# Patient Record
Sex: FEMALE | Race: ASIAN | NOT HISPANIC OR LATINO
[De-identification: names, ages, dates, MRNs, and addresses within clinical notes are randomized per-mention and may not be internally consistent; named-entity substitution may affect disease eponyms.]

---

## 2020-09-02 ENCOUNTER — NON-APPOINTMENT (OUTPATIENT)
Age: 45
End: 2020-09-02

## 2020-09-03 ENCOUNTER — APPOINTMENT (OUTPATIENT)
Dept: INTERNAL MEDICINE | Facility: CLINIC | Age: 45
End: 2020-09-03
Payer: MEDICAID

## 2020-09-03 VITALS
HEART RATE: 78 BPM | WEIGHT: 136 LBS | BODY MASS INDEX: 24.4 KG/M2 | RESPIRATION RATE: 14 BRPM | SYSTOLIC BLOOD PRESSURE: 120 MMHG | HEIGHT: 62.5 IN | TEMPERATURE: 97.7 F | OXYGEN SATURATION: 98 % | DIASTOLIC BLOOD PRESSURE: 70 MMHG

## 2020-09-03 DIAGNOSIS — Z78.9 OTHER SPECIFIED HEALTH STATUS: ICD-10-CM

## 2020-09-03 PROCEDURE — 99386 PREV VISIT NEW AGE 40-64: CPT

## 2020-09-03 NOTE — HISTORY OF PRESENT ILLNESS
[FreeTextEntry1] : HCM [de-identified] : 43 yo F w/ PMH of gallstones presents to the office for annual physical and work clearance. Patient recently immigrated from [place] this past May. Patient has never had a pap smear or mammogram. Patient denies any symptoms. Denies any HA, CP, SOB, n/v, abdominal pain, or dysuria.

## 2020-09-03 NOTE — ASSESSMENT
[FreeTextEntry1] : 45yo F w/ PMH of gallstones presents to the office for annual physical and work clearance

## 2020-09-03 NOTE — HEALTH RISK ASSESSMENT
[Very Good] : ~his/her~  mood as very good [No] : No [0] : 2) Feeling down, depressed, or hopeless: Not at all (0) [HIV test declined] : HIV test declined [Hepatitis C test declined] : Hepatitis C test declined [With Family] : lives with family [# Of Children ___] : has [unfilled] children [] : No [Change in mental status noted] : No change in mental status noted [Reports changes in hearing] : Reports no changes in hearing [Reports changes in vision] : Reports no changes in vision

## 2020-09-03 NOTE — PHYSICAL EXAM
[No Acute Distress] : no acute distress [Well Nourished] : well nourished [Normal Sclera/Conjunctiva] : normal sclera/conjunctiva [Well-Appearing] : well-appearing [Well Developed] : well developed [PERRL] : pupils equal round and reactive to light [EOMI] : extraocular movements intact [Normal Oropharynx] : the oropharynx was normal [Normal Outer Ear/Nose] : the outer ears and nose were normal in appearance [No Lymphadenopathy] : no lymphadenopathy [No JVD] : no jugular venous distention [Thyroid Normal, No Nodules] : the thyroid was normal and there were no nodules present [Supple] : supple [Clear to Auscultation] : lungs were clear to auscultation bilaterally [No Accessory Muscle Use] : no accessory muscle use [No Respiratory Distress] : no respiratory distress  [Regular Rhythm] : with a regular rhythm [Normal Rate] : normal rate  [Normal S1, S2] : normal S1 and S2 [No Murmur] : no murmur heard [No Abdominal Bruit] : a ~M bruit was not heard ~T in the abdomen [No Carotid Bruits] : no carotid bruits [Pedal Pulses Present] : the pedal pulses are present [No Edema] : there was no peripheral edema [No Varicosities] : no varicosities [No Extremity Clubbing/Cyanosis] : no extremity clubbing/cyanosis [No Palpable Aorta] : no palpable aorta [Soft] : abdomen soft [Non Tender] : non-tender [Non-distended] : non-distended [No Masses] : no abdominal mass palpated [No HSM] : no HSM [Normal Bowel Sounds] : normal bowel sounds [Normal Posterior Cervical Nodes] : no posterior cervical lymphadenopathy [Normal Anterior Cervical Nodes] : no anterior cervical lymphadenopathy [No Spinal Tenderness] : no spinal tenderness [No CVA Tenderness] : no CVA  tenderness [Grossly Normal Strength/Tone] : grossly normal strength/tone [No Joint Swelling] : no joint swelling [No Focal Deficits] : no focal deficits [Coordination Grossly Intact] : coordination grossly intact [No Rash] : no rash [Normal Gait] : normal gait [Normal Affect] : the affect was normal [Normal Insight/Judgement] : insight and judgment were intact

## 2020-09-03 NOTE — PLAN
[FreeTextEntry1] : HCM\par -f/u labs\par -referred for pap smear\par -mammogram script provided\par -pending Hep B and MMR titers, drug screen, and quantiferon results for employment physical\par

## 2020-09-04 LAB
25(OH)D3 SERPL-MCNC: 28.8 NG/ML
ALBUMIN SERPL ELPH-MCNC: 4.8 G/DL
ALP BLD-CCNC: 56 U/L
ALT SERPL-CCNC: 17 U/L
ANION GAP SERPL CALC-SCNC: 12 MMOL/L
AST SERPL-CCNC: 17 U/L
BASOPHILS # BLD AUTO: 0.02 K/UL
BASOPHILS NFR BLD AUTO: 0.2 %
BILIRUB SERPL-MCNC: 0.4 MG/DL
BUN SERPL-MCNC: 16 MG/DL
CALCIUM SERPL-MCNC: 9.8 MG/DL
CHLORIDE SERPL-SCNC: 103 MMOL/L
CHOLEST SERPL-MCNC: 210 MG/DL
CHOLEST/HDLC SERPL: 4.2 RATIO
CO2 SERPL-SCNC: 23 MMOL/L
CREAT SERPL-MCNC: 0.75 MG/DL
EOSINOPHIL # BLD AUTO: 0.07 K/UL
EOSINOPHIL NFR BLD AUTO: 0.9 %
ESTIMATED AVERAGE GLUCOSE: 123 MG/DL
FOLATE SERPL-MCNC: 13.3 NG/ML
GLUCOSE SERPL-MCNC: 87 MG/DL
HBA1C MFR BLD HPLC: 5.9 %
HCT VFR BLD CALC: 40.4 %
HDLC SERPL-MCNC: 51 MG/DL
HGB BLD-MCNC: 13.4 G/DL
IMM GRANULOCYTES NFR BLD AUTO: 0.5 %
LDLC SERPL CALC-MCNC: 128 MG/DL
LYMPHOCYTES # BLD AUTO: 1.84 K/UL
LYMPHOCYTES NFR BLD AUTO: 22.8 %
MAN DIFF?: NORMAL
MCHC RBC-ENTMCNC: 33.1 PG
MCHC RBC-ENTMCNC: 33.2 GM/DL
MCV RBC AUTO: 99.8 FL
MONOCYTES # BLD AUTO: 0.61 K/UL
MONOCYTES NFR BLD AUTO: 7.5 %
NEUTROPHILS # BLD AUTO: 5.5 K/UL
NEUTROPHILS NFR BLD AUTO: 68.1 %
PLATELET # BLD AUTO: 312 K/UL
POTASSIUM SERPL-SCNC: 4.5 MMOL/L
PROT SERPL-MCNC: 7.4 G/DL
RBC # BLD: 4.05 M/UL
RBC # FLD: 12.3 %
SODIUM SERPL-SCNC: 138 MMOL/L
TRIGL SERPL-MCNC: 160 MG/DL
TSH SERPL-ACNC: 4.2 UIU/ML
VIT B12 SERPL-MCNC: 601 PG/ML
WBC # FLD AUTO: 8.08 K/UL

## 2020-09-08 ENCOUNTER — TRANSCRIPTION ENCOUNTER (OUTPATIENT)
Age: 45
End: 2020-09-08

## 2020-09-08 LAB
AMPHET UR-MCNC: NEGATIVE
BARBITURATES UR-MCNC: NEGATIVE
BENZODIAZ UR-MCNC: NEGATIVE
COCAINE METAB.OTHER UR-MCNC: NEGATIVE
CREATININE, URINE: 163.7 MG/DL
HBV SURFACE AB SERPL IA-ACNC: <3 MIU/ML
M TB IFN-G BLD-IMP: NEGATIVE
METHADONE UR-MCNC: NEGATIVE
METHAQUALONE UR-MCNC: NEGATIVE
MEV IGG FLD QL IA: >300 AU/ML
MEV IGG+IGM SER-IMP: POSITIVE
MUV AB SER-ACNC: POSITIVE
MUV IGG SER QL IA: 193 AU/ML
OPIATES UR-MCNC: NEGATIVE
PCP UR-MCNC: NEGATIVE
PROPOXYPH UR QL: NEGATIVE
QUANTIFERON TB PLUS MITOGEN MINUS NIL: 6.92 IU/ML
QUANTIFERON TB PLUS NIL: 0.04 IU/ML
QUANTIFERON TB PLUS TB1 MINUS NIL: 0.08 IU/ML
QUANTIFERON TB PLUS TB2 MINUS NIL: 0.1 IU/ML
RUBV IGG FLD-ACNC: 5.8 INDEX
RUBV IGG SER-IMP: POSITIVE
THC UR QL: NEGATIVE

## 2020-09-11 ENCOUNTER — APPOINTMENT (OUTPATIENT)
Dept: INTERNAL MEDICINE | Facility: CLINIC | Age: 45
End: 2020-09-11
Payer: MEDICAID

## 2020-09-11 DIAGNOSIS — Z23 ENCOUNTER FOR IMMUNIZATION: ICD-10-CM

## 2020-09-11 PROCEDURE — 90472 IMMUNIZATION ADMIN EACH ADD: CPT

## 2020-09-11 PROCEDURE — 90686 IIV4 VACC NO PRSV 0.5 ML IM: CPT

## 2020-09-11 PROCEDURE — G0008: CPT

## 2020-09-11 PROCEDURE — 90746 HEPB VACCINE 3 DOSE ADULT IM: CPT

## 2021-09-06 ENCOUNTER — NON-APPOINTMENT (OUTPATIENT)
Age: 46
End: 2021-09-06

## 2021-09-09 ENCOUNTER — APPOINTMENT (OUTPATIENT)
Dept: INTERNAL MEDICINE | Facility: CLINIC | Age: 46
End: 2021-09-09
Payer: COMMERCIAL

## 2021-09-09 ENCOUNTER — LABORATORY RESULT (OUTPATIENT)
Age: 46
End: 2021-09-09

## 2021-09-09 VITALS
TEMPERATURE: 97.2 F | OXYGEN SATURATION: 98 % | SYSTOLIC BLOOD PRESSURE: 120 MMHG | DIASTOLIC BLOOD PRESSURE: 70 MMHG | BODY MASS INDEX: 22.79 KG/M2 | RESPIRATION RATE: 14 BRPM | HEART RATE: 89 BPM | WEIGHT: 127 LBS | HEIGHT: 62.5 IN

## 2021-09-09 PROCEDURE — G0008: CPT

## 2021-09-09 PROCEDURE — 99396 PREV VISIT EST AGE 40-64: CPT | Mod: 25

## 2021-09-09 PROCEDURE — 90686 IIV4 VACC NO PRSV 0.5 ML IM: CPT

## 2021-09-09 NOTE — HISTORY OF PRESENT ILLNESS
[de-identified] : Pt here for CPE. Feeling well, no acute complaints. Pt needs forms for job filled out.

## 2021-09-09 NOTE — PHYSICAL EXAM
[No Acute Distress] : no acute distress [Well Nourished] : well nourished [Well Developed] : well developed [Well-Appearing] : well-appearing [Normal Sclera/Conjunctiva] : normal sclera/conjunctiva [PERRL] : pupils equal round and reactive to light [EOMI] : extraocular movements intact [Normal Outer Ear/Nose] : the outer ears and nose were normal in appearance [Normal Oropharynx] : the oropharynx was normal [No JVD] : no jugular venous distention [No Lymphadenopathy] : no lymphadenopathy [Supple] : supple [Thyroid Normal, No Nodules] : the thyroid was normal and there were no nodules present [No Respiratory Distress] : no respiratory distress  [No Accessory Muscle Use] : no accessory muscle use [Clear to Auscultation] : lungs were clear to auscultation bilaterally [Normal Rate] : normal rate  [Regular Rhythm] : with a regular rhythm [Normal S1, S2] : normal S1 and S2 [No Murmur] : no murmur heard [No Abdominal Bruit] : a ~M bruit was not heard ~T in the abdomen [No Carotid Bruits] : no carotid bruits [No Varicosities] : no varicosities [Pedal Pulses Present] : the pedal pulses are present [No Edema] : there was no peripheral edema [No Palpable Aorta] : no palpable aorta [No Extremity Clubbing/Cyanosis] : no extremity clubbing/cyanosis [Soft] : abdomen soft [Non Tender] : non-tender [No Masses] : no abdominal mass palpated [Non-distended] : non-distended [No HSM] : no HSM [Normal Bowel Sounds] : normal bowel sounds [Normal Posterior Cervical Nodes] : no posterior cervical lymphadenopathy [Normal Anterior Cervical Nodes] : no anterior cervical lymphadenopathy [No CVA Tenderness] : no CVA  tenderness [No Spinal Tenderness] : no spinal tenderness [No Joint Swelling] : no joint swelling [Grossly Normal Strength/Tone] : grossly normal strength/tone [No Rash] : no rash [Coordination Grossly Intact] : coordination grossly intact [No Focal Deficits] : no focal deficits [Normal Gait] : normal gait [Deep Tendon Reflexes (DTR)] : deep tendon reflexes were 2+ and symmetric [Normal Affect] : the affect was normal [Normal Insight/Judgement] : insight and judgment were intact

## 2021-09-09 NOTE — PLAN
Problem: Communication  Goal: The ability to communicate needs accurately and effectively will improve  Outcome: Progressing     Problem: Safety  Goal: Will remain free from injury  Outcome: Progressing  Goal: Will remain free from falls  Outcome: Progressing     Problem: Skin Integrity  Goal: Risk for impaired skin integrity will decrease  Outcome: Progressing       Summary of progress made towards problems/goals:  Patient is able to verbalize needs, calls appropriately. Patient provided safety education, verbalizes understanding. Bed is in lowest/locked position. Bed alarm on. Q2 turns in place. Frequent checks for incontinence.        [FreeTextEntry1] : HCM: \par -check labs\par -mammo script provided\par -advise f/u with GYN for pap smear \par -pt is covid vaccinated\par -flu shot today \par

## 2021-09-16 ENCOUNTER — APPOINTMENT (OUTPATIENT)
Dept: MAMMOGRAPHY | Facility: CLINIC | Age: 46
End: 2021-09-16
Payer: COMMERCIAL

## 2021-09-16 ENCOUNTER — RESULT REVIEW (OUTPATIENT)
Age: 46
End: 2021-09-16

## 2021-09-16 ENCOUNTER — OUTPATIENT (OUTPATIENT)
Dept: OUTPATIENT SERVICES | Facility: HOSPITAL | Age: 46
LOS: 1 days | End: 2021-09-16
Payer: COMMERCIAL

## 2021-09-16 DIAGNOSIS — Z00.8 ENCOUNTER FOR OTHER GENERAL EXAMINATION: ICD-10-CM

## 2021-09-16 DIAGNOSIS — Z00.00 ENCOUNTER FOR GENERAL ADULT MEDICAL EXAMINATION WITHOUT ABNORMAL FINDINGS: ICD-10-CM

## 2021-09-16 LAB
25(OH)D3 SERPL-MCNC: 40.3 NG/ML
ALBUMIN SERPL ELPH-MCNC: 4.6 G/DL
ALP BLD-CCNC: 61 U/L
ALT SERPL-CCNC: 17 U/L
AMPHET UR-MCNC: NEGATIVE
ANION GAP SERPL CALC-SCNC: 11 MMOL/L
AST SERPL-CCNC: 18 U/L
BARBITURATES UR-MCNC: NEGATIVE
BASOPHILS # BLD AUTO: 0.04 K/UL
BASOPHILS NFR BLD AUTO: 0.6 %
BENZODIAZ UR-MCNC: NEGATIVE
BILIRUB SERPL-MCNC: 0.4 MG/DL
BUN SERPL-MCNC: 11 MG/DL
CALCIUM SERPL-MCNC: 9.4 MG/DL
CHLORIDE SERPL-SCNC: 104 MMOL/L
CHOLEST SERPL-MCNC: 224 MG/DL
CO2 SERPL-SCNC: 27 MMOL/L
COCAINE METAB.OTHER UR-MCNC: NEGATIVE
CREAT SERPL-MCNC: 0.79 MG/DL
CREATININE, URINE: 182.4 MG/DL
EOSINOPHIL # BLD AUTO: 0.17 K/UL
EOSINOPHIL NFR BLD AUTO: 2.6 %
ESTIMATED AVERAGE GLUCOSE: 120 MG/DL
FOLATE SERPL-MCNC: >20 NG/ML
GLUCOSE SERPL-MCNC: 97 MG/DL
HBA1C MFR BLD HPLC: 5.8 %
HBV SURFACE AB SER QL: NONREACTIVE
HCT VFR BLD CALC: 38.8 %
HDLC SERPL-MCNC: 55 MG/DL
HGB BLD-MCNC: 13.5 G/DL
IMM GRANULOCYTES NFR BLD AUTO: 0.3 %
LDLC SERPL CALC-MCNC: 140 MG/DL
LYMPHOCYTES # BLD AUTO: 1.83 K/UL
LYMPHOCYTES NFR BLD AUTO: 27.7 %
M TB IFN-G BLD-IMP: NEGATIVE
MAN DIFF?: NORMAL
MCHC RBC-ENTMCNC: 33.3 PG
MCHC RBC-ENTMCNC: 34.8 GM/DL
MCV RBC AUTO: 95.6 FL
METHADONE UR-MCNC: NEGATIVE
METHAQUALONE UR-MCNC: NEGATIVE
MONOCYTES # BLD AUTO: 0.43 K/UL
MONOCYTES NFR BLD AUTO: 6.5 %
NEUTROPHILS # BLD AUTO: 4.12 K/UL
NEUTROPHILS NFR BLD AUTO: 62.3 %
NONHDLC SERPL-MCNC: 170 MG/DL
OPIATES UR-MCNC: NEGATIVE
PCP UR-MCNC: NEGATIVE
PLATELET # BLD AUTO: 306 K/UL
POTASSIUM SERPL-SCNC: 4.4 MMOL/L
PROPOXYPH UR QL: NEGATIVE
PROT SERPL-MCNC: 7.4 G/DL
QUANTIFERON TB PLUS MITOGEN MINUS NIL: 9.79 IU/ML
QUANTIFERON TB PLUS NIL: 0.03 IU/ML
QUANTIFERON TB PLUS TB1 MINUS NIL: 0.11 IU/ML
QUANTIFERON TB PLUS TB2 MINUS NIL: 0.29 IU/ML
RBC # BLD: 4.06 M/UL
RBC # FLD: 12.2 %
SODIUM SERPL-SCNC: 141 MMOL/L
THC UR QL: NEGATIVE
TRIGL SERPL-MCNC: 147 MG/DL
TSH SERPL-ACNC: 7.51 UIU/ML
VIT B12 SERPL-MCNC: 1953 PG/ML
WBC # FLD AUTO: 6.61 K/UL

## 2021-09-16 PROCEDURE — 77063 BREAST TOMOSYNTHESIS BI: CPT | Mod: 26

## 2021-09-16 PROCEDURE — 77067 SCR MAMMO BI INCL CAD: CPT

## 2021-09-16 PROCEDURE — 77067 SCR MAMMO BI INCL CAD: CPT | Mod: 26

## 2021-09-16 PROCEDURE — 77063 BREAST TOMOSYNTHESIS BI: CPT

## 2021-09-17 ENCOUNTER — APPOINTMENT (OUTPATIENT)
Dept: INTERNAL MEDICINE | Facility: CLINIC | Age: 46
End: 2021-09-17
Payer: COMMERCIAL

## 2021-09-17 PROCEDURE — G0010: CPT

## 2021-09-17 PROCEDURE — 90746 HEPB VACCINE 3 DOSE ADULT IM: CPT

## 2021-12-16 ENCOUNTER — NON-APPOINTMENT (OUTPATIENT)
Age: 46
End: 2021-12-16

## 2021-12-17 ENCOUNTER — LABORATORY RESULT (OUTPATIENT)
Age: 46
End: 2021-12-17

## 2021-12-17 ENCOUNTER — APPOINTMENT (OUTPATIENT)
Dept: OBGYN | Facility: CLINIC | Age: 46
End: 2021-12-17
Payer: COMMERCIAL

## 2021-12-17 VITALS
WEIGHT: 136 LBS | BODY MASS INDEX: 25.03 KG/M2 | SYSTOLIC BLOOD PRESSURE: 106 MMHG | DIASTOLIC BLOOD PRESSURE: 60 MMHG | HEIGHT: 62 IN

## 2021-12-17 DIAGNOSIS — Z01.419 ENCOUNTER FOR GYNECOLOGICAL EXAMINATION (GENERAL) (ROUTINE) W/OUT ABNORMAL FINDINGS: ICD-10-CM

## 2021-12-17 PROCEDURE — 99386 PREV VISIT NEW AGE 40-64: CPT

## 2021-12-20 LAB — HPV HIGH+LOW RISK DNA PNL CVX: DETECTED

## 2021-12-28 ENCOUNTER — NON-APPOINTMENT (OUTPATIENT)
Age: 46
End: 2021-12-28

## 2022-03-07 ENCOUNTER — APPOINTMENT (OUTPATIENT)
Dept: OBGYN | Facility: CLINIC | Age: 47
End: 2022-03-07
Payer: COMMERCIAL

## 2022-03-07 ENCOUNTER — LABORATORY RESULT (OUTPATIENT)
Age: 47
End: 2022-03-07

## 2022-03-07 PROCEDURE — 81025 URINE PREGNANCY TEST: CPT

## 2022-03-07 PROCEDURE — 57454 BX/CURETT OF CERVIX W/SCOPE: CPT

## 2022-03-07 NOTE — PROCEDURE
[Colposcopy] : Colposcopy  [Time out performed] : Pre-procedure time out performed.  Patient's name, date of birth and procedure confirmed. [Consent Obtained] : Consent obtained [Risks] : risks [Benefits] : benefits [Alternatives] : alternatives [Patient] : patient [Infection] : infection [Bleeding] : bleeding [Allergic Reaction] : allergic reaction [LGSIL] : LGSIL [Colposcopy Adequate] : colposcopy adequate [SCI Fully Visualized] : SCI fully visualized [ECC Performed] : ECC performed [No Abnormalities] : no abnormalities [Biopsy] : biopsy taken [Hemostasis Obtained] : Hemostasis obtained [Tolerated Well] : the patient tolerated the procedure well [de-identified] : 2 [de-identified] : 6 and 7 o'clock [de-identified] : acetowhite at both sites

## 2022-03-10 ENCOUNTER — NON-APPOINTMENT (OUTPATIENT)
Age: 47
End: 2022-03-10

## 2022-04-07 ENCOUNTER — LABORATORY RESULT (OUTPATIENT)
Age: 47
End: 2022-04-07

## 2022-04-07 ENCOUNTER — APPOINTMENT (OUTPATIENT)
Dept: OBGYN | Facility: CLINIC | Age: 47
End: 2022-04-07
Payer: COMMERCIAL

## 2022-04-07 VITALS
DIASTOLIC BLOOD PRESSURE: 52 MMHG | SYSTOLIC BLOOD PRESSURE: 100 MMHG | BODY MASS INDEX: 25.03 KG/M2 | WEIGHT: 136 LBS | HEIGHT: 62 IN

## 2022-04-07 PROCEDURE — 81025 URINE PREGNANCY TEST: CPT

## 2022-04-07 PROCEDURE — 57460 BX OF CERVIX W/SCOPE LEEP: CPT

## 2022-04-07 NOTE — PROCEDURE
[Colposcopy with Loop Electrode Excision of the Cervix] : Colposcopy with loop electrode excision of the cervix [Time out performed] : Pre-procedure time out performed.  Patient's name, date of birth and procedure confirmed. [Consent Obtained] : Consent obtained [Severe Dysplasia] : severe dysplasia [Positive ECC] : positive ECC [Risks] : risks [Benefits] : benefits [Alternatives] : alternatives [Patient] : patient [Infection] : infection [Bleeding] : bleeding [Allergic Reaction] : allergic reaction [Injury to Vagina] : injury to vagina [Injury to Cervix] : injury to cervix [Negative] : negative pregnancy test [____mg Lidocaine w/Epi] : ~Vmg  lidocaine with epinephrine [SCJ Fully Visualized] : SCJ fully visualized [Cutting Setting ___watts] : cutting setting [unfilled] chaudhary [Coag Setting ___watts] : coag setting [unfilled] chaudhary [ECC Done] : ECC done [Hemostasis Obtained] : Hemostasis obtained [Sent to Pathology] : the specimen was placed in buffered formalin and sent for pathology [Tolerated Well] : the patient tolerated the procedure well [de-identified] : Liat's solution

## 2022-04-08 LAB
HCG UR QL: NEGATIVE
QUALITY CONTROL: YES

## 2022-04-12 ENCOUNTER — NON-APPOINTMENT (OUTPATIENT)
Age: 47
End: 2022-04-12

## 2022-09-12 ENCOUNTER — APPOINTMENT (OUTPATIENT)
Dept: INTERNAL MEDICINE | Facility: CLINIC | Age: 47
End: 2022-09-12

## 2022-09-12 VITALS
DIASTOLIC BLOOD PRESSURE: 70 MMHG | OXYGEN SATURATION: 99 % | TEMPERATURE: 97.5 F | BODY MASS INDEX: 23.55 KG/M2 | SYSTOLIC BLOOD PRESSURE: 100 MMHG | RESPIRATION RATE: 14 BRPM | WEIGHT: 128 LBS | HEIGHT: 62 IN | HEART RATE: 76 BPM

## 2022-09-12 PROCEDURE — 99396 PREV VISIT EST AGE 40-64: CPT | Mod: 25

## 2022-09-12 PROCEDURE — 90686 IIV4 VACC NO PRSV 0.5 ML IM: CPT

## 2022-09-12 PROCEDURE — G0008: CPT

## 2022-09-12 NOTE — PLAN
[FreeTextEntry1] : HCM:\par -check labs \par -UTD with pap smear \par -due for mammo, script provided\par -iFOBT provided\par -flu shot today \par \par

## 2022-09-12 NOTE — HISTORY OF PRESENT ILLNESS
[de-identified] : Pt here for CPE. Needs forms filled out for work. Feeling well, no acute complaints.

## 2022-09-13 LAB
25(OH)D3 SERPL-MCNC: 35 NG/ML
ALBUMIN SERPL ELPH-MCNC: 4.7 G/DL
ALP BLD-CCNC: 73 U/L
ALT SERPL-CCNC: 23 U/L
ANION GAP SERPL CALC-SCNC: 9 MMOL/L
AST SERPL-CCNC: 18 U/L
BASOPHILS # BLD AUTO: 0.03 K/UL
BASOPHILS NFR BLD AUTO: 0.6 %
BILIRUB SERPL-MCNC: 0.5 MG/DL
BUN SERPL-MCNC: 13 MG/DL
CALCIUM SERPL-MCNC: 9.9 MG/DL
CHLORIDE SERPL-SCNC: 105 MMOL/L
CHOLEST SERPL-MCNC: 231 MG/DL
CO2 SERPL-SCNC: 27 MMOL/L
CREAT SERPL-MCNC: 0.71 MG/DL
EGFR: 105 ML/MIN/1.73M2
EOSINOPHIL # BLD AUTO: 0.06 K/UL
EOSINOPHIL NFR BLD AUTO: 1.1 %
ESTIMATED AVERAGE GLUCOSE: 120 MG/DL
FOLATE SERPL-MCNC: 18.5 NG/ML
GLUCOSE SERPL-MCNC: 97 MG/DL
HBA1C MFR BLD HPLC: 5.8 %
HBV SURFACE AB SER QL: NONREACTIVE
HCT VFR BLD CALC: 41.2 %
HDLC SERPL-MCNC: 58 MG/DL
HGB BLD-MCNC: 13.8 G/DL
IMM GRANULOCYTES NFR BLD AUTO: 0.2 %
LDLC SERPL CALC-MCNC: 140 MG/DL
LYMPHOCYTES # BLD AUTO: 1.6 K/UL
LYMPHOCYTES NFR BLD AUTO: 30.2 %
MAN DIFF?: NORMAL
MCHC RBC-ENTMCNC: 32.5 PG
MCHC RBC-ENTMCNC: 33.5 GM/DL
MCV RBC AUTO: 96.9 FL
MONOCYTES # BLD AUTO: 0.32 K/UL
MONOCYTES NFR BLD AUTO: 6 %
NEUTROPHILS # BLD AUTO: 3.28 K/UL
NEUTROPHILS NFR BLD AUTO: 61.9 %
NONHDLC SERPL-MCNC: 173 MG/DL
PLATELET # BLD AUTO: 344 K/UL
POTASSIUM SERPL-SCNC: 4.9 MMOL/L
PROT SERPL-MCNC: 7.4 G/DL
RBC # BLD: 4.25 M/UL
RBC # FLD: 12 %
SODIUM SERPL-SCNC: 142 MMOL/L
TRIGL SERPL-MCNC: 165 MG/DL
TSH SERPL-ACNC: 2.42 UIU/ML
VIT B12 SERPL-MCNC: 841 PG/ML
WBC # FLD AUTO: 5.3 K/UL

## 2022-09-15 LAB
M TB IFN-G BLD-IMP: NEGATIVE
QUANTIFERON TB PLUS MITOGEN MINUS NIL: >10 IU/ML
QUANTIFERON TB PLUS NIL: 0.02 IU/ML
QUANTIFERON TB PLUS TB1 MINUS NIL: 0.09 IU/ML
QUANTIFERON TB PLUS TB2 MINUS NIL: 0.07 IU/ML

## 2022-09-17 LAB
AMPHET UR-MCNC: NEGATIVE
BARBITURATES UR-MCNC: NEGATIVE
BENZODIAZ UR-MCNC: NEGATIVE
COCAINE METAB.OTHER UR-MCNC: NEGATIVE
CREATININE, URINE: 134.3 MG/DL
METHADONE UR-MCNC: NEGATIVE
METHAQUALONE UR-MCNC: NEGATIVE
OPIATES UR-MCNC: NEGATIVE
PCP UR-MCNC: NEGATIVE
PROPOXYPH UR QL: NEGATIVE
THC UR QL: NEGATIVE

## 2022-09-21 ENCOUNTER — APPOINTMENT (OUTPATIENT)
Dept: INTERNAL MEDICINE | Facility: CLINIC | Age: 47
End: 2022-09-21

## 2022-09-21 PROCEDURE — 90746 HEPB VACCINE 3 DOSE ADULT IM: CPT

## 2022-09-21 PROCEDURE — G0010: CPT

## 2023-09-19 ENCOUNTER — APPOINTMENT (OUTPATIENT)
Dept: INTERNAL MEDICINE | Facility: CLINIC | Age: 48
End: 2023-09-19
Payer: MEDICAID

## 2023-09-19 VITALS
BODY MASS INDEX: 24.11 KG/M2 | TEMPERATURE: 97.5 F | OXYGEN SATURATION: 98 % | HEART RATE: 75 BPM | DIASTOLIC BLOOD PRESSURE: 62 MMHG | HEIGHT: 62 IN | SYSTOLIC BLOOD PRESSURE: 100 MMHG | WEIGHT: 131 LBS | RESPIRATION RATE: 14 BRPM

## 2023-09-19 DIAGNOSIS — Z23 ENCOUNTER FOR IMMUNIZATION: ICD-10-CM

## 2023-09-19 DIAGNOSIS — Z00.00 ENCOUNTER FOR GENERAL ADULT MEDICAL EXAMINATION W/OUT ABNORMAL FINDINGS: ICD-10-CM

## 2023-09-19 PROCEDURE — 99396 PREV VISIT EST AGE 40-64: CPT | Mod: 25

## 2023-09-19 PROCEDURE — G0008: CPT

## 2023-09-19 PROCEDURE — 90686 IIV4 VACC NO PRSV 0.5 ML IM: CPT

## 2023-09-21 LAB
ALBUMIN SERPL ELPH-MCNC: 4.9 G/DL
ALP BLD-CCNC: 69 U/L
ALT SERPL-CCNC: 22 U/L
ANION GAP SERPL CALC-SCNC: 15 MMOL/L
AST SERPL-CCNC: 18 U/L
BASOPHILS # BLD AUTO: 0.05 K/UL
BASOPHILS NFR BLD AUTO: 0.9 %
BILIRUB SERPL-MCNC: 0.5 MG/DL
BUN SERPL-MCNC: 14 MG/DL
CALCIUM SERPL-MCNC: 9.9 MG/DL
CHLORIDE SERPL-SCNC: 105 MMOL/L
CHOLEST SERPL-MCNC: 248 MG/DL
CO2 SERPL-SCNC: 21 MMOL/L
CREAT SERPL-MCNC: 0.67 MG/DL
EGFR: 108 ML/MIN/1.73M2
EOSINOPHIL # BLD AUTO: 0.1 K/UL
EOSINOPHIL NFR BLD AUTO: 1.7 %
ESTIMATED AVERAGE GLUCOSE: 128 MG/DL
GLUCOSE SERPL-MCNC: 108 MG/DL
HBA1C MFR BLD HPLC: 6.1 %
HCT VFR BLD CALC: 40.4 %
HDLC SERPL-MCNC: 52 MG/DL
HGB BLD-MCNC: 14 G/DL
IMM GRANULOCYTES NFR BLD AUTO: 0.2 %
LDLC SERPL CALC-MCNC: 150 MG/DL
LYMPHOCYTES # BLD AUTO: 1.83 K/UL
LYMPHOCYTES NFR BLD AUTO: 31.3 %
MAN DIFF?: NORMAL
MCHC RBC-ENTMCNC: 33.6 PG
MCHC RBC-ENTMCNC: 34.7 GM/DL
MCV RBC AUTO: 96.9 FL
MONOCYTES # BLD AUTO: 0.36 K/UL
MONOCYTES NFR BLD AUTO: 6.2 %
NEUTROPHILS # BLD AUTO: 3.49 K/UL
NEUTROPHILS NFR BLD AUTO: 59.7 %
NONHDLC SERPL-MCNC: 197 MG/DL
PLATELET # BLD AUTO: 327 K/UL
POTASSIUM SERPL-SCNC: 4.3 MMOL/L
PROT SERPL-MCNC: 7.7 G/DL
RBC # BLD: 4.17 M/UL
RBC # FLD: 12.1 %
SODIUM SERPL-SCNC: 141 MMOL/L
TRIGL SERPL-MCNC: 253 MG/DL
TSH SERPL-ACNC: 3.72 UIU/ML
WBC # FLD AUTO: 5.84 K/UL

## 2023-10-21 ENCOUNTER — OUTPATIENT (OUTPATIENT)
Dept: OUTPATIENT SERVICES | Facility: HOSPITAL | Age: 48
LOS: 1 days | End: 2023-10-21
Payer: MEDICAID

## 2023-10-21 ENCOUNTER — APPOINTMENT (OUTPATIENT)
Dept: MAMMOGRAPHY | Facility: CLINIC | Age: 48
End: 2023-10-21
Payer: MEDICAID

## 2023-10-21 ENCOUNTER — RESULT REVIEW (OUTPATIENT)
Age: 48
End: 2023-10-21

## 2023-10-21 DIAGNOSIS — Z00.00 ENCOUNTER FOR GENERAL ADULT MEDICAL EXAMINATION WITHOUT ABNORMAL FINDINGS: ICD-10-CM

## 2023-10-21 DIAGNOSIS — Z00.8 ENCOUNTER FOR OTHER GENERAL EXAMINATION: ICD-10-CM

## 2023-10-21 PROCEDURE — 77067 SCR MAMMO BI INCL CAD: CPT

## 2023-10-21 PROCEDURE — 77063 BREAST TOMOSYNTHESIS BI: CPT | Mod: 26

## 2023-10-21 PROCEDURE — 77063 BREAST TOMOSYNTHESIS BI: CPT

## 2023-10-21 PROCEDURE — 77067 SCR MAMMO BI INCL CAD: CPT | Mod: 26

## 2023-11-16 PROBLEM — K80.20 GALLSTONE: Status: ACTIVE | Noted: 2020-09-03

## 2023-11-16 PROBLEM — R87.612 LOW GRADE SQUAMOUS INTRAEPITHELIAL LESION (LGSIL) ON CERVICAL PAP SMEAR: Status: ACTIVE | Noted: 2022-03-07

## 2023-11-16 PROBLEM — D06.9 SEVERE CERVICAL DYSPLASIA: Status: ACTIVE | Noted: 2022-04-07

## 2023-11-21 ENCOUNTER — APPOINTMENT (OUTPATIENT)
Dept: GASTROENTEROLOGY | Facility: CLINIC | Age: 48
End: 2023-11-21
Payer: MEDICAID

## 2023-11-21 VITALS — BODY MASS INDEX: 24.1 KG/M2 | WEIGHT: 136 LBS | HEIGHT: 63 IN

## 2023-11-21 VITALS — SYSTOLIC BLOOD PRESSURE: 114 MMHG | OXYGEN SATURATION: 96 % | DIASTOLIC BLOOD PRESSURE: 75 MMHG | HEART RATE: 80 BPM

## 2023-11-21 DIAGNOSIS — D06.9 CARCINOMA IN SITU OF CERVIX, UNSPECIFIED: ICD-10-CM

## 2023-11-21 DIAGNOSIS — K80.20 CALCULUS OF GALLBLADDER W/OUT CHOLECYSTITIS W/OUT OBSTRUCTION: ICD-10-CM

## 2023-11-21 DIAGNOSIS — R87.612 LOW GRADE SQUAMOUS INTRAEPITHELIAL LESION ON CYTOLOGIC SMEAR OF CERVIX (LGSIL): ICD-10-CM

## 2023-11-21 DIAGNOSIS — Z12.11 ENCOUNTER FOR SCREENING FOR MALIGNANT NEOPLASM OF COLON: ICD-10-CM

## 2023-11-21 DIAGNOSIS — Z78.9 OTHER SPECIFIED HEALTH STATUS: ICD-10-CM

## 2023-11-21 DIAGNOSIS — Z83.1 FAMILY HISTORY OF OTHER INFECTIOUS AND PARASITIC DISEASES: ICD-10-CM

## 2023-11-21 DIAGNOSIS — Z12.12 ENCOUNTER FOR SCREENING FOR MALIGNANT NEOPLASM OF COLON: ICD-10-CM

## 2023-11-21 PROCEDURE — 99204 OFFICE O/P NEW MOD 45 MIN: CPT

## 2024-02-01 ENCOUNTER — LABORATORY RESULT (OUTPATIENT)
Age: 49
End: 2024-02-01

## 2024-02-01 ENCOUNTER — APPOINTMENT (OUTPATIENT)
Dept: OBGYN | Facility: CLINIC | Age: 49
End: 2024-02-01
Payer: COMMERCIAL

## 2024-02-01 VITALS
DIASTOLIC BLOOD PRESSURE: 66 MMHG | HEIGHT: 63 IN | HEART RATE: 78 BPM | RESPIRATION RATE: 18 BRPM | SYSTOLIC BLOOD PRESSURE: 116 MMHG | BODY MASS INDEX: 23.92 KG/M2 | WEIGHT: 135 LBS

## 2024-02-01 DIAGNOSIS — Z01.419 ENCOUNTER FOR GYNECOLOGICAL EXAMINATION (GENERAL) (ROUTINE) W/OUT ABNORMAL FINDINGS: ICD-10-CM

## 2024-02-01 PROCEDURE — 99396 PREV VISIT EST AGE 40-64: CPT

## 2024-02-02 LAB
CYTOLOGY CVX/VAG DOC THIN PREP: NORMAL
ESTRADIOL SERPL-MCNC: 14 PG/ML
FSH SERPL-MCNC: 54.4 IU/L
HPV HIGH+LOW RISK DNA PNL CVX: NOT DETECTED
PROLACTIN SERPL-MCNC: 5.6 NG/ML
TSH SERPL-ACNC: 4.28 UIU/ML

## 2024-02-08 ENCOUNTER — APPOINTMENT (OUTPATIENT)
Dept: INTERNAL MEDICINE | Facility: CLINIC | Age: 49
End: 2024-02-08
Payer: COMMERCIAL

## 2024-02-08 VITALS
HEART RATE: 74 BPM | SYSTOLIC BLOOD PRESSURE: 118 MMHG | RESPIRATION RATE: 16 BRPM | DIASTOLIC BLOOD PRESSURE: 72 MMHG | TEMPERATURE: 97.9 F | WEIGHT: 135 LBS | HEIGHT: 63 IN | BODY MASS INDEX: 23.92 KG/M2 | OXYGEN SATURATION: 99 %

## 2024-02-08 DIAGNOSIS — R79.89 OTHER SPECIFIED ABNORMAL FINDINGS OF BLOOD CHEMISTRY: ICD-10-CM

## 2024-02-08 PROCEDURE — 99213 OFFICE O/P EST LOW 20 MIN: CPT

## 2024-02-08 RX ORDER — SODIUM SULFATE, POTASSIUM SULFATE AND MAGNESIUM SULFATE 1.6; 3.13; 17.5 G/177ML; G/177ML; G/177ML
17.5-3.13-1.6 SOLUTION ORAL
Qty: 1 | Refills: 0 | Status: DISCONTINUED | COMMUNITY
Start: 2023-11-21 | End: 2024-02-08

## 2024-02-08 NOTE — HISTORY OF PRESENT ILLNESS
[FreeTextEntry1] : Follow-up [de-identified] : 47 y/o F PMHx cervical dysplasia, L ovary atrophy presenting for bloodwork follow-up. Patient had bloodwork with OB/GYN last week for irregular menses, and was informed to f/u with PCP regarding abnormal TSH. Patient had menstrual period in Nov 2023, prior to that menses was in Dec 2022. Currently denies fatigue, hair loss, weight gain, skin changes, recent illness. Notably, TSH was mildly elevated in 2021, but normalized afterwards. Additionally, last bloodwork in this office revealed elevated lipid and A1c levels. Patient has not made any dietary changes and does not exercise regularly. Not currently on medication.

## 2024-10-26 ENCOUNTER — NON-APPOINTMENT (OUTPATIENT)
Age: 49
End: 2024-10-26

## 2024-10-26 ENCOUNTER — APPOINTMENT (OUTPATIENT)
Dept: INTERNAL MEDICINE | Facility: CLINIC | Age: 49
End: 2024-10-26
Payer: COMMERCIAL

## 2024-10-26 ENCOUNTER — LABORATORY RESULT (OUTPATIENT)
Age: 49
End: 2024-10-26

## 2024-10-26 VITALS
SYSTOLIC BLOOD PRESSURE: 120 MMHG | DIASTOLIC BLOOD PRESSURE: 70 MMHG | WEIGHT: 132 LBS | BODY MASS INDEX: 23.39 KG/M2 | OXYGEN SATURATION: 98 % | HEART RATE: 68 BPM | RESPIRATION RATE: 14 BRPM | HEIGHT: 63 IN

## 2024-10-26 DIAGNOSIS — Z23 ENCOUNTER FOR IMMUNIZATION: ICD-10-CM

## 2024-10-26 DIAGNOSIS — R79.89 OTHER SPECIFIED ABNORMAL FINDINGS OF BLOOD CHEMISTRY: ICD-10-CM

## 2024-10-26 DIAGNOSIS — Z00.00 ENCOUNTER FOR GENERAL ADULT MEDICAL EXAMINATION W/OUT ABNORMAL FINDINGS: ICD-10-CM

## 2024-10-26 PROCEDURE — 93000 ELECTROCARDIOGRAM COMPLETE: CPT

## 2024-10-26 PROCEDURE — 99386 PREV VISIT NEW AGE 40-64: CPT

## 2024-10-28 LAB
25(OH)D3 SERPL-MCNC: 29.3 NG/ML
ALBUMIN SERPL ELPH-MCNC: 4.5 G/DL
ALP BLD-CCNC: 72 U/L
ALT SERPL-CCNC: 25 U/L
AMPHET UR-MCNC: NEGATIVE NG/ML
ANION GAP SERPL CALC-SCNC: 11 MMOL/L
APPEARANCE: CLEAR
AST SERPL-CCNC: 20 U/L
BACTERIA: NEGATIVE /HPF
BARBITURATES UR-MCNC: NEGATIVE NG/ML
BASOPHILS # BLD AUTO: 0.03 K/UL
BASOPHILS NFR BLD AUTO: 0.7 %
BENZODIAZ UR-MCNC: NEGATIVE NG/ML
BILIRUB SERPL-MCNC: 0.5 MG/DL
BILIRUBIN URINE: NEGATIVE
BLOOD URINE: NEGATIVE
BUN SERPL-MCNC: 12 MG/DL
CALCIUM SERPL-MCNC: 9.8 MG/DL
CAST: 0 /LPF
CHLORIDE SERPL-SCNC: 106 MMOL/L
CHOLEST SERPL-MCNC: 224 MG/DL
CO2 SERPL-SCNC: 22 MMOL/L
COCAINE METAB.OTHER UR-MCNC: NEGATIVE NG/ML
COLOR: YELLOW
CREAT SERPL-MCNC: 0.73 MG/DL
CREATININE, URINE: 109.3 MG/DL
EGFR: 100 ML/MIN/1.73M2
EOSINOPHIL # BLD AUTO: 0.05 K/UL
EOSINOPHIL NFR BLD AUTO: 1.1 %
EPITHELIAL CELLS: 8 /HPF
ESTIMATED AVERAGE GLUCOSE: 117 MG/DL
FENTANYL, URINE: NEGATIVE NG/ML
FOLATE SERPL-MCNC: 19.7 NG/ML
GLUCOSE QUALITATIVE U: NEGATIVE MG/DL
GLUCOSE SERPL-MCNC: 112 MG/DL
HBA1C MFR BLD HPLC: 5.7 %
HCT VFR BLD CALC: 39.4 %
HDLC SERPL-MCNC: 57 MG/DL
HGB BLD-MCNC: 13.6 G/DL
IMM GRANULOCYTES NFR BLD AUTO: 0.2 %
KETONES URINE: NEGATIVE MG/DL
LDLC SERPL CALC-MCNC: 138 MG/DL
LEUKOCYTE ESTERASE URINE: ABNORMAL
LYMPHOCYTES # BLD AUTO: 1.39 K/UL
LYMPHOCYTES NFR BLD AUTO: 30.4 %
MAN DIFF?: NORMAL
MCHC RBC-ENTMCNC: 33.1 PG
MCHC RBC-ENTMCNC: 34.5 GM/DL
MCV RBC AUTO: 95.9 FL
METHADONE SCREEN, UR: NEGATIVE NG/ML
MEV IGG FLD QL IA: 294 AU/ML
MEV IGG+IGM SER-IMP: POSITIVE
MICROSCOPIC-UA: NORMAL
MONOCYTES # BLD AUTO: 0.25 K/UL
MONOCYTES NFR BLD AUTO: 5.5 %
MUV AB SER-ACNC: POSITIVE
MUV IGG SER QL IA: 119 AU/ML
NEUTROPHILS # BLD AUTO: 2.84 K/UL
NEUTROPHILS NFR BLD AUTO: 62.1 %
NITRITE URINE: NEGATIVE
NONHDLC SERPL-MCNC: 167 MG/DL
OPIATES UR-MCNC: NEGATIVE NG/ML
OXYCODONE/OXYMORPHONE, URINE: NEGATIVE NG/ML
PCP UR-MCNC: NEGATIVE NG/ML
PH URINE: 5.5
PH, URINE: 5.3
PLATELET # BLD AUTO: 296 K/UL
POTASSIUM SERPL-SCNC: 4.3 MMOL/L
PROT SERPL-MCNC: 7.3 G/DL
PROTEIN URINE: NEGATIVE MG/DL
RBC # BLD: 4.11 M/UL
RBC # FLD: 12.2 %
RED BLOOD CELLS URINE: 2 /HPF
RUBV IGG FLD-ACNC: 6.12 INDEX
RUBV IGG SER-IMP: POSITIVE
SODIUM SERPL-SCNC: 140 MMOL/L
SPECIFIC GRAVITY URINE: 1.02
T3RU NFR SERPL: 1.1 TBI
T4 FREE SERPL-MCNC: 1.3 NG/DL
THC UR QL: NEGATIVE NG/ML
TRIGL SERPL-MCNC: 165 MG/DL
TSH SERPL-ACNC: 6.02 UIU/ML
TSH SERPL-ACNC: 6.26 UIU/ML
UROBILINOGEN URINE: 0.2 MG/DL
VIT B12 SERPL-MCNC: 770 PG/ML
WBC # FLD AUTO: 4.57 K/UL
WHITE BLOOD CELLS URINE: 2 /HPF

## 2024-11-01 LAB
M TB IFN-G BLD-IMP: POSITIVE
QUANTIFERON TB PLUS MITOGEN MINUS NIL: >10 IU/ML
QUANTIFERON TB PLUS NIL: 0.04 IU/ML
QUANTIFERON TB PLUS TB1 MINUS NIL: 0.37 IU/ML
QUANTIFERON TB PLUS TB2 MINUS NIL: 0.36 IU/ML

## 2024-11-05 ENCOUNTER — NON-APPOINTMENT (OUTPATIENT)
Age: 49
End: 2024-11-05

## 2024-11-05 DIAGNOSIS — R76.12 NONSPECIFIC REACTION TO CELL MEDIATED IMMUNITY MEASUREMENT OF GAMMA INTERFERON ANTIGEN RESPONSE W/OUT ACTIVE TUBERCULOSIS: ICD-10-CM

## 2024-11-05 RX ORDER — RIFAMPIN 300 MG/1
300 CAPSULE ORAL DAILY
Qty: 1 | Refills: 2 | Status: ACTIVE | COMMUNITY
Start: 2024-11-05 | End: 1900-01-01

## 2024-11-07 ENCOUNTER — APPOINTMENT (OUTPATIENT)
Dept: MAMMOGRAPHY | Facility: CLINIC | Age: 49
End: 2024-11-07
Payer: COMMERCIAL

## 2024-11-07 ENCOUNTER — APPOINTMENT (OUTPATIENT)
Dept: RADIOLOGY | Facility: CLINIC | Age: 49
End: 2024-11-07
Payer: COMMERCIAL

## 2024-11-07 ENCOUNTER — OUTPATIENT (OUTPATIENT)
Dept: OUTPATIENT SERVICES | Facility: HOSPITAL | Age: 49
LOS: 1 days | End: 2024-11-07
Payer: COMMERCIAL

## 2024-11-07 ENCOUNTER — RESULT REVIEW (OUTPATIENT)
Age: 49
End: 2024-11-07

## 2024-11-07 ENCOUNTER — APPOINTMENT (OUTPATIENT)
Dept: ULTRASOUND IMAGING | Facility: CLINIC | Age: 49
End: 2024-11-07
Payer: COMMERCIAL

## 2024-11-07 DIAGNOSIS — Z00.00 ENCOUNTER FOR GENERAL ADULT MEDICAL EXAMINATION WITHOUT ABNORMAL FINDINGS: ICD-10-CM

## 2024-11-07 PROCEDURE — 77063 BREAST TOMOSYNTHESIS BI: CPT | Mod: 26

## 2024-11-07 PROCEDURE — 71046 X-RAY EXAM CHEST 2 VIEWS: CPT | Mod: 26

## 2024-11-07 PROCEDURE — 76641 ULTRASOUND BREAST COMPLETE: CPT

## 2024-11-07 PROCEDURE — 77067 SCR MAMMO BI INCL CAD: CPT | Mod: 26

## 2024-11-07 PROCEDURE — 71046 X-RAY EXAM CHEST 2 VIEWS: CPT

## 2024-11-07 PROCEDURE — 77063 BREAST TOMOSYNTHESIS BI: CPT

## 2024-11-07 PROCEDURE — 76641 ULTRASOUND BREAST COMPLETE: CPT | Mod: 26,50

## 2024-11-07 PROCEDURE — 77067 SCR MAMMO BI INCL CAD: CPT

## 2025-01-24 ENCOUNTER — RX RENEWAL (OUTPATIENT)
Age: 50
End: 2025-01-24

## 2025-02-06 ENCOUNTER — NON-APPOINTMENT (OUTPATIENT)
Age: 50
End: 2025-02-06

## 2025-02-06 ENCOUNTER — APPOINTMENT (OUTPATIENT)
Dept: OBGYN | Facility: CLINIC | Age: 50
End: 2025-02-06
Payer: COMMERCIAL

## 2025-02-06 VITALS
DIASTOLIC BLOOD PRESSURE: 68 MMHG | SYSTOLIC BLOOD PRESSURE: 112 MMHG | WEIGHT: 132 LBS | HEIGHT: 63 IN | BODY MASS INDEX: 23.39 KG/M2

## 2025-02-06 DIAGNOSIS — Z01.419 ENCOUNTER FOR GYNECOLOGICAL EXAMINATION (GENERAL) (ROUTINE) W/OUT ABNORMAL FINDINGS: ICD-10-CM

## 2025-02-06 PROCEDURE — 99396 PREV VISIT EST AGE 40-64: CPT

## 2025-02-09 LAB — HPV HIGH+LOW RISK DNA PNL CVX: NOT DETECTED

## 2025-02-11 LAB — CYTOLOGY CVX/VAG DOC THIN PREP: ABNORMAL

## 2025-02-26 ENCOUNTER — APPOINTMENT (OUTPATIENT)
Dept: INTERNAL MEDICINE | Facility: CLINIC | Age: 50
End: 2025-02-26
Payer: MEDICAID

## 2025-02-26 ENCOUNTER — LABORATORY RESULT (OUTPATIENT)
Age: 50
End: 2025-02-26

## 2025-02-26 VITALS
BODY MASS INDEX: 23.04 KG/M2 | TEMPERATURE: 99 F | RESPIRATION RATE: 14 BRPM | OXYGEN SATURATION: 97 % | SYSTOLIC BLOOD PRESSURE: 118 MMHG | WEIGHT: 130 LBS | DIASTOLIC BLOOD PRESSURE: 76 MMHG | HEART RATE: 93 BPM | HEIGHT: 63 IN

## 2025-02-26 DIAGNOSIS — T78.40XA ALLERGY, UNSPECIFIED, INITIAL ENCOUNTER: ICD-10-CM

## 2025-02-26 PROCEDURE — G2211 COMPLEX E/M VISIT ADD ON: CPT | Mod: NC

## 2025-02-26 PROCEDURE — 99214 OFFICE O/P EST MOD 30 MIN: CPT

## 2025-02-26 RX ORDER — PREDNISONE 20 MG/1
20 TABLET ORAL DAILY
Qty: 10 | Refills: 0 | Status: ACTIVE | COMMUNITY
Start: 2025-02-26 | End: 1900-01-01

## 2025-03-03 LAB
A ALTERNATA IGE QN: <0.1 KUA/L
A FUMIGATUS IGE QN: <0.1 KUA/L
ALBUMIN SERPL ELPH-MCNC: 4.7 G/DL
ALMOND IGE QN: <0.1 KUA/L
ALP BLD-CCNC: 73 U/L
ALT SERPL-CCNC: 15 U/L
ANION GAP SERPL CALC-SCNC: 14 MMOL/L
AST SERPL-CCNC: 16 U/L
BERMUDA GRASS IGE QN: <0.1 KUA/L
BILIRUB SERPL-MCNC: 0.3 MG/DL
BOXELDER IGE QN: <0.1 KUA/L
BRAZIL NUT IGE QN: <0.1 KUA/L
BUN SERPL-MCNC: 15 MG/DL
C HERBARUM IGE QN: <0.1 KUA/L
CALCIUM SERPL-MCNC: 9.3 MG/DL
CALIF WALNUT IGE QN: <0.1 KUA/L
CASHEW NUT IGE QN: <0.1 KUA/L
CAT DANDER IGE QN: <0.1 KUA/L
CHLORIDE SERPL-SCNC: 105 MMOL/L
CMN PIGWEED IGE QN: <0.1 KUA/L
CO2 SERPL-SCNC: 25 MMOL/L
CODFISH IGE QN: <0.1 KUA/L
COMMON RAGWEED IGE QN: <0.1 KUA/L
COTTONWOOD IGE QN: <0.1 KUA/L
COW MILK IGE QN: <0.1 KUA/L
CREAT SERPL-MCNC: 0.81 MG/DL
D FARINAE IGE QN: 1.06 KUA/L
D PTERONYSS IGE QN: 1.12 KUA/L
DEPRECATED A ALTERNATA IGE RAST QL: 0
DEPRECATED A FUMIGATUS IGE RAST QL: 0
DEPRECATED ALMOND IGE RAST QL: 0
DEPRECATED BERMUDA GRASS IGE RAST QL: 0
DEPRECATED BOXELDER IGE RAST QL: 0
DEPRECATED BRAZIL NUT IGE RAST QL: 0
DEPRECATED C HERBARUM IGE RAST QL: 0
DEPRECATED CASHEW NUT IGE RAST QL: 0
DEPRECATED CAT DANDER IGE RAST QL: 0
DEPRECATED CODFISH IGE RAST QL: 0
DEPRECATED COMMON PIGWEED IGE RAST QL: 0
DEPRECATED COMMON RAGWEED IGE RAST QL: 0
DEPRECATED COTTONWOOD IGE RAST QL: 0
DEPRECATED COW MILK IGE RAST QL: 0
DEPRECATED D FARINAE IGE RAST QL: 2
DEPRECATED D PTERONYSS IGE RAST QL: 2
DEPRECATED DOG DANDER IGE RAST QL: 0
DEPRECATED EGG WHITE IGE RAST QL: 0
DEPRECATED GOOSEFOOT IGE RAST QL: 0
DEPRECATED HAZELNUT IGE RAST QL: 0
DEPRECATED LONDON PLANE IGE RAST QL: 0
DEPRECATED MOUSE URINE PROT IGE RAST QL: 0
DEPRECATED MUGWORT IGE RAST QL: 0
DEPRECATED P NOTATUM IGE RAST QL: 0
DEPRECATED PEANUT IGE RAST QL: 0
DEPRECATED RED CEDAR IGE RAST QL: 0
DEPRECATED ROACH IGE RAST QL: 1
DEPRECATED SALMON IGE RAST QL: 0
DEPRECATED SCALLOP IGE RAST QL: <0.1 KUA/L
DEPRECATED SESAME SEED IGE RAST QL: 0
DEPRECATED SHEEP SORREL IGE RAST QL: 0
DEPRECATED SHRIMP IGE RAST QL: 1
DEPRECATED SILVER BIRCH IGE RAST QL: 0
DEPRECATED SOYBEAN IGE RAST QL: 0
DEPRECATED TIMOTHY IGE RAST QL: 0
DEPRECATED TUNA IGE RAST QL: 0
DEPRECATED WALNUT IGE RAST QL: 0
DEPRECATED WHEAT IGE RAST QL: 0
DEPRECATED WHITE ASH IGE RAST QL: 0
DEPRECATED WHITE OAK IGE RAST QL: 0
DOG DANDER IGE QN: <0.1 KUA/L
EGFR: 88 ML/MIN/1.73M2
EGG WHITE IGE QN: <0.1 KUA/L
GLUCOSE SERPL-MCNC: 145 MG/DL
GOOSEFOOT IGE QN: <0.1 KUA/L
HAZELNUT IGE QN: <0.1 KUA/L
HCT VFR BLD CALC: 37.9 %
HGB BLD-MCNC: 13.2 G/DL
LONDON PLANE IGE QN: <0.1 KUA/L
MCHC RBC-ENTMCNC: 33.3 PG
MCHC RBC-ENTMCNC: 34.8 G/DL
MCV RBC AUTO: 95.7 FL
MOUSE URINE PROT IGE QN: <0.1 KUA/L
MUGWORT IGE QN: <0.1 KUA/L
MULBERRY (T70) CLASS: 0
MULBERRY (T70) CONC: <0.1 KUA/L
P NOTATUM IGE QN: <0.1 KUA/L
PEANUT IGE QN: <0.1 KUA/L
PLATELET # BLD AUTO: 273 K/UL
POTASSIUM SERPL-SCNC: 4.1 MMOL/L
PROT SERPL-MCNC: 7.2 G/DL
RBC # BLD: 3.96 M/UL
RBC # FLD: 12.8 %
RED CEDAR IGE QN: <0.1 KUA/L
ROACH IGE QN: 0.46 KUA/L
SALMON IGE QN: <0.1 KUA/L
SCALLOP IGE QN: 0
SCALLOP IGE QN: 0.39 KUA/L
SESAME SEED IGE QN: <0.1 KUA/L
SHEEP SORREL IGE QN: <0.1 KUA/L
SILVER BIRCH IGE QN: <0.1 KUA/L
SODIUM SERPL-SCNC: 143 MMOL/L
SOYBEAN IGE QN: <0.1 KUA/L
TIMOTHY IGE QN: <0.1 KUA/L
TOTAL IGE SMQN RAST: 58 KU/L
TOTAL IGE SMQN RAST: 58 KU/L
TREE ALLERG MIX1 IGE QL: 0
TSH SERPL-ACNC: 3.35 UIU/ML
TUNA IGE QN: <0.1 KUA/L
WALNUT IGE QN: <0.1 KUA/L
WBC # FLD AUTO: 6.57 K/UL
WHEAT IGE QN: <0.1 KUA/L
WHITE ASH IGE QN: <0.1 KUA/L
WHITE ELM IGE QN: 0
WHITE ELM IGE QN: <0.1 KUA/L
WHITE OAK IGE QN: <0.1 KUA/L

## 2025-03-07 ENCOUNTER — APPOINTMENT (OUTPATIENT)
Dept: INTERNAL MEDICINE | Facility: CLINIC | Age: 50
End: 2025-03-07
Payer: COMMERCIAL

## 2025-03-07 VITALS
SYSTOLIC BLOOD PRESSURE: 108 MMHG | WEIGHT: 132 LBS | RESPIRATION RATE: 14 BRPM | OXYGEN SATURATION: 98 % | DIASTOLIC BLOOD PRESSURE: 68 MMHG | TEMPERATURE: 98.6 F | HEART RATE: 72 BPM | BODY MASS INDEX: 23.39 KG/M2 | HEIGHT: 63 IN

## 2025-03-07 DIAGNOSIS — R21 RASH AND OTHER NONSPECIFIC SKIN ERUPTION: ICD-10-CM

## 2025-03-07 PROCEDURE — G2211 COMPLEX E/M VISIT ADD ON: CPT | Mod: NC

## 2025-03-07 PROCEDURE — 99213 OFFICE O/P EST LOW 20 MIN: CPT

## 2025-03-07 RX ORDER — TRIAMCINOLONE ACETONIDE 1 MG/G
0.1 CREAM TOPICAL 3 TIMES DAILY
Qty: 45 | Refills: 0 | Status: ACTIVE | COMMUNITY
Start: 2025-03-07 | End: 1900-01-01

## 2025-09-02 ENCOUNTER — APPOINTMENT (OUTPATIENT)
Dept: INTERNAL MEDICINE | Facility: CLINIC | Age: 50
End: 2025-09-02
Payer: MEDICAID

## 2025-09-02 VITALS
RESPIRATION RATE: 14 BRPM | SYSTOLIC BLOOD PRESSURE: 118 MMHG | BODY MASS INDEX: 23.39 KG/M2 | HEART RATE: 71 BPM | TEMPERATURE: 98.2 F | HEIGHT: 63 IN | WEIGHT: 132 LBS | OXYGEN SATURATION: 98 % | DIASTOLIC BLOOD PRESSURE: 64 MMHG

## 2025-09-02 DIAGNOSIS — L30.9 DERMATITIS, UNSPECIFIED: ICD-10-CM

## 2025-09-02 PROCEDURE — G2211 COMPLEX E/M VISIT ADD ON: CPT | Mod: NC

## 2025-09-02 PROCEDURE — 99214 OFFICE O/P EST MOD 30 MIN: CPT

## 2025-09-02 RX ORDER — TRIAMCINOLONE ACETONIDE 1 MG/G
0.1 CREAM TOPICAL 3 TIMES DAILY
Qty: 45 | Refills: 0 | Status: ACTIVE | COMMUNITY
Start: 2025-09-02 | End: 1900-01-01